# Patient Record
Sex: FEMALE | Race: WHITE | Employment: UNEMPLOYED | URBAN - METROPOLITAN AREA
[De-identification: names, ages, dates, MRNs, and addresses within clinical notes are randomized per-mention and may not be internally consistent; named-entity substitution may affect disease eponyms.]

---

## 2018-09-23 ENCOUNTER — HOSPITAL ENCOUNTER (EMERGENCY)
Age: 28
Discharge: HOME | End: 2018-09-23
Attending: EMERGENCY MEDICINE
Payer: SELF-PAY

## 2018-09-23 ENCOUNTER — APPOINTMENT (OUTPATIENT)
Dept: GENERAL RADIOLOGY | Age: 28
End: 2018-09-23
Attending: EMERGENCY MEDICINE
Payer: SELF-PAY

## 2018-09-23 VITALS
HEART RATE: 70 BPM | DIASTOLIC BLOOD PRESSURE: 85 MMHG | OXYGEN SATURATION: 100 % | WEIGHT: 150 LBS | HEIGHT: 69 IN | BODY MASS INDEX: 22.22 KG/M2 | SYSTOLIC BLOOD PRESSURE: 132 MMHG | TEMPERATURE: 98 F | RESPIRATION RATE: 18 BRPM

## 2018-09-23 DIAGNOSIS — S61.421A LACERATION OF RIGHT HAND WITH FOREIGN BODY, INITIAL ENCOUNTER: Primary | ICD-10-CM

## 2018-09-23 PROCEDURE — 73130 X-RAY EXAM OF HAND: CPT

## 2018-09-23 PROCEDURE — 75810000295 HC COMPLEX WND RPR: Performed by: PHYSICIAN ASSISTANT

## 2018-09-23 PROCEDURE — 77030002916 HC SUT ETHLN J&J -A

## 2018-09-23 PROCEDURE — 74011250637 HC RX REV CODE- 250/637: Performed by: PHYSICIAN ASSISTANT

## 2018-09-23 PROCEDURE — 90715 TDAP VACCINE 7 YRS/> IM: CPT | Performed by: PHYSICIAN ASSISTANT

## 2018-09-23 PROCEDURE — 74011250636 HC RX REV CODE- 250/636: Performed by: PHYSICIAN ASSISTANT

## 2018-09-23 PROCEDURE — 90471 IMMUNIZATION ADMIN: CPT | Performed by: PHYSICIAN ASSISTANT

## 2018-09-23 PROCEDURE — 77030003028 HC SUT VCRL J&J -A

## 2018-09-23 PROCEDURE — 99283 EMERGENCY DEPT VISIT LOW MDM: CPT | Performed by: PHYSICIAN ASSISTANT

## 2018-09-23 RX ORDER — DOXYCYCLINE HYCLATE 100 MG
100 TABLET ORAL 2 TIMES DAILY
Qty: 20 TAB | Refills: 0 | Status: SHIPPED | OUTPATIENT
Start: 2018-09-23 | End: 2018-10-03

## 2018-09-23 RX ORDER — KETOROLAC TROMETHAMINE 10 MG/1
10 TABLET, FILM COATED ORAL
Status: COMPLETED | OUTPATIENT
Start: 2018-09-23 | End: 2018-09-23

## 2018-09-23 RX ORDER — TRAMADOL HYDROCHLORIDE 50 MG/1
50 TABLET ORAL
Qty: 10 TAB | Refills: 0 | Status: SHIPPED | OUTPATIENT
Start: 2018-09-23 | End: 2018-09-23

## 2018-09-23 RX ORDER — HYDROCODONE BITARTRATE AND ACETAMINOPHEN 5; 325 MG/1; MG/1
1 TABLET ORAL
Qty: 5 TAB | Refills: 0 | Status: SHIPPED | OUTPATIENT
Start: 2018-09-23

## 2018-09-23 RX ORDER — HYDROCODONE BITARTRATE AND ACETAMINOPHEN 7.5; 325 MG/1; MG/1
1 TABLET ORAL
Status: COMPLETED | OUTPATIENT
Start: 2018-09-23 | End: 2018-09-23

## 2018-09-23 RX ADMIN — HYDROCODONE BITARTRATE AND ACETAMINOPHEN 1 TABLET: 7.5; 325 TABLET ORAL at 11:05

## 2018-09-23 RX ADMIN — TETANUS TOXOID, REDUCED DIPHTHERIA TOXOID AND ACELLULAR PERTUSSIS VACCINE, ADSORBED 0.5 ML: 5; 2.5; 8; 8; 2.5 SUSPENSION INTRAMUSCULAR at 10:54

## 2018-09-23 RX ADMIN — KETOROLAC TROMETHAMINE 10 MG: 10 TABLET, FILM COATED ORAL at 11:05

## 2018-09-23 NOTE — Clinical Note
Wash two-three times daily with soap and water, blot dry, apply neosporin and a clean dressing. Watch for redness, swelling, pus, increasing pain, fever and return if any of those symptoms began. Return to the ED if worse. Finish all of the antibiotics.   Return to the ED in 2 weeks for suture removal.

## 2018-09-23 NOTE — ED PROVIDER NOTES
HPI Comments: Patient states she was trying to hook up a trailer to the talar head on a car prior to arrival and it started to slip backwards. It rolled over her right hand and crushed it. She has pain in her hand and a laceration between her right ring finger. She is right-handed. She has no other injuries or complaints and is unsure of her last tetanus shot. She was ambulatory to the room without difficulty and well hydrated. Patient is a 25 y.o. female presenting with hand pain. The history is provided by the patient. Hand Pain This is a new problem. The current episode started less than 1 hour ago. The problem occurs constantly. The problem has not changed since onset. The pain is present in the right hand. The quality of the pain is described as aching. The pain is at a severity of 8/10. The pain is moderate. Associated symptoms include limited range of motion and stiffness. Pertinent negatives include no numbness, no tingling, no itching, no back pain and no neck pain. The symptoms are aggravated by movement and activity. She has tried nothing for the symptoms. There has been a history of trauma. History reviewed. No pertinent past medical history. History reviewed. No pertinent surgical history. History reviewed. No pertinent family history. Social History Social History  Marital status: SINGLE Spouse name: N/A  
 Number of children: N/A  
 Years of education: N/A Occupational History  Not on file. Social History Main Topics  Smoking status: Not on file  Smokeless tobacco: Not on file  Alcohol use Not on file  Drug use: Not on file  Sexual activity: Not on file Other Topics Concern  Not on file Social History Narrative  No narrative on file ALLERGIES: Review of patient's allergies indicates no known allergies. Review of Systems Constitutional: Negative. HENT: Negative. Eyes: Negative. Respiratory: Negative. Cardiovascular: Negative. Gastrointestinal: Negative. Genitourinary: Negative. Musculoskeletal: Positive for stiffness. Negative for back pain and neck pain. Skin: Positive for wound. Negative for itching. Neurological: Negative. Negative for tingling and numbness. Psychiatric/Behavioral: Negative. All other systems reviewed and are negative. Vitals:  
 09/23/18 1029 BP: 135/89 Pulse: 75 Resp: 18 Temp: 97.9 °F (36.6 °C) SpO2: 100% Weight: 68 kg (150 lb) Height: 5' 9\" (1.753 m) Physical Exam  
Constitutional: She is oriented to person, place, and time. She appears well-developed and well-nourished. HENT:  
Head: Normocephalic and atraumatic. Right Ear: External ear normal.  
Left Ear: External ear normal.  
Nose: Nose normal.  
Mouth/Throat: Oropharynx is clear and moist.  
Eyes: Conjunctivae and EOM are normal. Pupils are equal, round, and reactive to light. Neck: Normal range of motion. Neck supple. Cardiovascular: Normal rate, regular rhythm, normal heart sounds and intact distal pulses. Pulmonary/Chest: Effort normal and breath sounds normal.  
Abdominal: Soft. Bowel sounds are normal.  
Neurological: She is alert and oriented to person, place, and time. She has normal reflexes. Skin: Skin is warm and dry. Psychiatric: She has a normal mood and affect. Her behavior is normal. Judgment and thought content normal.  
Nursing note and vitals reviewed. [facility] 
 
 
ED Course Wound Repair 
Date/Time: 9/23/2018 12:34 PM 
Performed by: PAPreparation: skin prepped with Betadine Pre-procedure re-eval: Immediately prior to the procedure, the patient was reevaluated and found suitable for the planned procedure and any planned medications. Time out: Immediately prior to the procedure a time out was called to verify the correct patient, procedure, equipment, staff and marking as appropriate. [place] Location details: right index finger Wound length:2.5 cm or less Anesthesia: digital block Anesthesia: 
Local Anesthetic: lidocaine 1% without epinephrine Anesthetic total: 5 mL Foreign bodies: metal 
Irrigation solution: saline Irrigation method: syringe (500 ml) Skin closure: 4-0 nylon Subcutaneous closure: Vicryl Number of sutures: 10 Technique: simple and interrupted Dressing: antibiotic ointment and tube gauze Patient tolerance: Patient tolerated the procedure well with no immediate complications My total time at bedside, performing this procedure was 31-45 minutes. The patient was observed in the ED. Results Reviewed: XR HAND RT MIN 3 V Final Result IMPRESSION:   
1. No evidence of acute fracture. Soft tissue swelling about the fourth finger  
with punctate radiopaque metallic foreign bodies. Isrrael Lehman RN into assist with the procedure. Patient initially did not want to receive the tetanus shot. She was counseled on the risks and benefits of receiving it. She decided to do it. Patient then placed in the procedure room for cleaning and closure of the room. After sterile prep with betadine, a digital block was done with 1% lidocaine without epinephrine. The foreign body was visualized in the wound and the wound was irrigated with 500 mL of normal saline. The foreign body was still present so a needle  was used to remove it. There was bleeding following removal of the foreign body. The vessel was tied off with 3 Vicryl sutures and the bleeding was controlled as an there was no more bleeding. During the procedure the patient and her friend attempted multiple times to photograph and video of the procedure. They were told that the onset that it was against hospital policy to do this however they continue to do this.   After the fifth time of asking them to put their phones down, it was against hospital policy, they started to become argumentative and state \"it is my body and I will do what I want including video it. That's a stupid policy and I do not need to follow it. That is the first time I ever heard that before. \" They questioned why the wound was bleeding so much and we explained that we were removing the foreign body that was lodged in the wound and the bleeding started however it was controlled without difficulty. I placed 7 simple interrupted sutures along the surface of the wound. Neosporin and a sterile dressing were placed. Patient was instructed on wound care. I did prescribe some Norco, very short-term as there are no broken bones and patient tells me that tramadol makes her nauseous. She did not want anything for nausea, she wanted the Norco.  Patient was told that long-term use of that was not indicated and only a few days were given. I also wrote an antibiotic that she should fill due to the nature of the wound. An aluminum foam finger splint was placed. I discussed the results of all labs, procedures, radiographs, and treatments with the patient and available family. Treatment plan is agreed upon and the patient is ready for discharge. All voiced understanding of the discharge plan and medication instructions or changes as appropriate. Questions about treatment in the ED were answered. All were encouraged to return should symptoms worsen or new problems develop. Patient was stable for discharge. She ambulated out of the vertical care without difficulty.

## 2018-09-23 NOTE — LETTER
[id number] [facility] EMERGENCY DEPT [address] [address] [phone] Work/School Note Date: 9/23/2018 To Whom It May concern: 
 
Bro Tesfaye was seen and treated today in the emergency room by the following provider(s): 
Attending Provider: Mady Schneider MD 
Physician Assistant: SHELDON King. Bro Tesfaye may return to work on 09/26/18. Sincerely, SHELDON King

## 2018-09-23 NOTE — DISCHARGE INSTRUCTIONS

## 2018-09-23 NOTE — ED TRIAGE NOTES
Pt reports a trailer rolled over her right hand earlier today. Pt reports mainly pain to right ring finger, laceration noted as well. Unsure of last tetanus shot

## 2018-09-23 NOTE — ED NOTES
I have reviewed discharge instructions with the patient. The patient verbalized understanding. Patient left ED via Discharge Method: ambulatory to Home with (friend). Opportunity for questions and clarification provided. Patient given 2 scripts. To continue your aftercare when you leave the hospital, you may receive an automated call from our care team to check in on how you are doing. This is a free service and part of our promise to provide the best care and service to meet your aftercare needs.  If you have questions, or wish to unsubscribe from this service please call [phone]. Thank you for Choosing our [facility] Emergency Department.